# Patient Record
Sex: MALE | Race: WHITE
[De-identification: names, ages, dates, MRNs, and addresses within clinical notes are randomized per-mention and may not be internally consistent; named-entity substitution may affect disease eponyms.]

---

## 2022-07-25 ENCOUNTER — HOSPITAL ENCOUNTER (OUTPATIENT)
Dept: HOSPITAL 76 - DI.N | Age: 77
Discharge: HOME | End: 2022-07-25
Attending: NURSE PRACTITIONER
Payer: MEDICARE

## 2022-07-25 DIAGNOSIS — M51.37: ICD-10-CM

## 2022-07-25 DIAGNOSIS — M47.816: Primary | ICD-10-CM

## 2022-07-25 DIAGNOSIS — M47.817: ICD-10-CM

## 2022-07-25 DIAGNOSIS — M51.36: ICD-10-CM

## 2022-07-25 NOTE — XRAY REPORT
PROCEDURE:  Lumbar Spine Complete

 

INDICATIONS:  ACUTE BACK PX

 

TECHNIQUE:  5 views of the lumbar spine were acquired.  

 

COMPARISON:  None.

 

FINDINGS:  

 

Bones:  5 non-rib-bearing vertebrae are present. No significant curvature of the lumbar spine. 4 mm r
etrolisthesis L3 on L4, 4 mm retrolisthesis L2 on L3, likely degenerative.

 

No vertebral body compression fractures.  No suspicious bony lesions.  Moderate multilevel degenerati
ve changes with disc height loss and endplate spurring. Facet arthropathy also present at multiple le
vels, most pronounced at L4-5 and L5-S1. No definite pars defects identified on oblique images.

 

Soft tissues:  Overlying bowel gas pattern is nonobstructive. Prominent vascular calcifications are p
resent.

 

IMPRESSION:  

1. No acute lumbar spine fracture visualized radiographically. CT or MRI could be obtained if indicat
ed.

2. Moderate multilevel degenerative changes of the lumbar spine.

 

Reviewed by: Ricardo Nguyen MD on 7/25/2022 11:57 AM PDT

Approved by: Ricardo Nguyen MD on 7/25/2022 11:57 AM PDT

 

 

Station ID:  [id number]

## 2022-07-27 ENCOUNTER — HOSPITAL ENCOUNTER (OUTPATIENT)
Dept: HOSPITAL 76 - LAB.N | Age: 77
Discharge: HOME | End: 2022-07-27
Attending: PHYSICIAN ASSISTANT
Payer: MEDICARE

## 2022-07-27 DIAGNOSIS — M54.32: Primary | ICD-10-CM

## 2022-07-27 LAB
ANION GAP SERPL CALCULATED.4IONS-SCNC: 11 MMOL/L (ref 6–13)
BUN SERPL-MCNC: 27 MG/DL (ref 6–20)
CALCIUM UR-MCNC: 10.1 MG/DL (ref 8.5–10.3)
CHLORIDE SERPL-SCNC: 91 MMOL/L (ref 101–111)
CO2 SERPL-SCNC: 29 MMOL/L (ref 21–32)
CREAT SERPLBLD-SCNC: 1 MG/DL (ref 0.6–1.2)
GFRSERPLBLD MDRD-ARVRAT: 72 ML/MIN/{1.73_M2} (ref 89–?)
GLUCOSE SERPL-MCNC: 186 MG/DL (ref 70–100)
POTASSIUM SERPL-SCNC: 4.2 MMOL/L (ref 3.5–5)
SODIUM SERPLBLD-SCNC: 131 MMOL/L (ref 135–145)

## 2022-07-27 PROCEDURE — 36415 COLL VENOUS BLD VENIPUNCTURE: CPT

## 2022-07-27 PROCEDURE — 80048 BASIC METABOLIC PNL TOTAL CA: CPT
